# Patient Record
Sex: FEMALE | Race: WHITE
[De-identification: names, ages, dates, MRNs, and addresses within clinical notes are randomized per-mention and may not be internally consistent; named-entity substitution may affect disease eponyms.]

---

## 2018-12-26 ENCOUNTER — HOSPITAL ENCOUNTER (OUTPATIENT)
Dept: HOSPITAL 97 - ER | Age: 83
Setting detail: OBSERVATION
LOS: 1 days | Discharge: HOME | End: 2018-12-27
Attending: INTERNAL MEDICINE | Admitting: INTERNAL MEDICINE
Payer: COMMERCIAL

## 2018-12-26 VITALS — BODY MASS INDEX: 25 KG/M2

## 2018-12-26 DIAGNOSIS — I48.2: Primary | ICD-10-CM

## 2018-12-26 DIAGNOSIS — Z88.2: ICD-10-CM

## 2018-12-26 DIAGNOSIS — I10: ICD-10-CM

## 2018-12-26 DIAGNOSIS — M32.9: ICD-10-CM

## 2018-12-26 DIAGNOSIS — K21.9: ICD-10-CM

## 2018-12-26 DIAGNOSIS — E78.5: ICD-10-CM

## 2018-12-26 DIAGNOSIS — Z79.01: ICD-10-CM

## 2018-12-26 DIAGNOSIS — Z85.72: ICD-10-CM

## 2018-12-26 LAB
ALBUMIN SERPL BCP-MCNC: 3.7 G/DL (ref 3.4–5)
ALP SERPL-CCNC: 87 U/L (ref 45–117)
ALT SERPL W P-5'-P-CCNC: 43 U/L (ref 12–78)
AST SERPL W P-5'-P-CCNC: 36 U/L (ref 15–37)
BUN BLD-MCNC: 21 MG/DL (ref 7–18)
GLUCOSE SERPLBLD-MCNC: 140 MG/DL (ref 74–106)
HCT VFR BLD CALC: 35.1 % (ref 36–45)
INR BLD: 1.59
LYMPHOCYTES # SPEC AUTO: 1 K/UL (ref 0.7–4.9)
MAGNESIUM SERPL-MCNC: 2 MG/DL (ref 1.8–2.4)
PMV BLD: 8.4 FL (ref 7.6–11.3)
POTASSIUM SERPL-SCNC: 3.7 MMOL/L (ref 3.5–5.1)
RBC # BLD: 3.49 M/UL (ref 3.86–4.86)
TROPONIN (EMERG DEPT USE ONLY): 0.05 NG/ML (ref 0–0.04)
TSH SERPL DL<=0.05 MIU/L-ACNC: 6.21 UIU/ML (ref 0.36–3.74)

## 2018-12-26 PROCEDURE — 85610 PROTHROMBIN TIME: CPT

## 2018-12-26 PROCEDURE — 80048 BASIC METABOLIC PNL TOTAL CA: CPT

## 2018-12-26 PROCEDURE — 93306 TTE W/DOPPLER COMPLETE: CPT

## 2018-12-26 PROCEDURE — 84484 ASSAY OF TROPONIN QUANT: CPT

## 2018-12-26 PROCEDURE — 96375 TX/PRO/DX INJ NEW DRUG ADDON: CPT

## 2018-12-26 PROCEDURE — 71046 X-RAY EXAM CHEST 2 VIEWS: CPT

## 2018-12-26 PROCEDURE — 71045 X-RAY EXAM CHEST 1 VIEW: CPT

## 2018-12-26 PROCEDURE — 85025 COMPLETE CBC W/AUTO DIFF WBC: CPT

## 2018-12-26 PROCEDURE — 80076 HEPATIC FUNCTION PANEL: CPT

## 2018-12-26 PROCEDURE — 83735 ASSAY OF MAGNESIUM: CPT

## 2018-12-26 PROCEDURE — 36415 COLL VENOUS BLD VENIPUNCTURE: CPT

## 2018-12-26 PROCEDURE — 87088 URINE BACTERIA CULTURE: CPT

## 2018-12-26 PROCEDURE — 84443 ASSAY THYROID STIM HORMONE: CPT

## 2018-12-26 PROCEDURE — 84439 ASSAY OF FREE THYROXINE: CPT

## 2018-12-26 PROCEDURE — 99285 EMERGENCY DEPT VISIT HI MDM: CPT

## 2018-12-26 PROCEDURE — 97162 PT EVAL MOD COMPLEX 30 MIN: CPT

## 2018-12-26 PROCEDURE — 93005 ELECTROCARDIOGRAM TRACING: CPT

## 2018-12-26 PROCEDURE — 96374 THER/PROPH/DIAG INJ IV PUSH: CPT

## 2018-12-26 PROCEDURE — 83880 ASSAY OF NATRIURETIC PEPTIDE: CPT

## 2018-12-26 PROCEDURE — 87086 URINE CULTURE/COLONY COUNT: CPT

## 2018-12-26 NOTE — EDPHYS
Physician Documentation                                                                           

 [facility]                                                                

Name: Yi Stein                                                                              

Age: 83 yrs                                                                                       

Sex: Female                                                                                       

: 1935                                                                                   

MRN: [id number]                                                                                   

Arrival Date: 2018                                                                          

Time: 22:00                                                                                       

Account#: [id number]                                                                            

Bed 3                                                                                             

Private MD:                                                                                       

[facility] Physician Demetrio Peñaloza                                                                      

HPI:                                                                                              

                                                                                             

22:51 This 83 yrs old  Female presents to ER via Wheelchair with complaints of       eliza 

      Shortness Of Breath.                                                                        

22:51 The patient has shortness of breath at rest. Onset: The symptoms/episode began/occurred eliza 

      3 day(s) ago. Duration: The symptoms are continuous, and are steadily getting worse.        

      The patient's shortness of breath is aggravated by nothing, is alleviated by nothing.       

      Associated signs and symptoms: The patient has no apparent associated signs or              

      symptoms. Severity of symptoms: At their worst the symptoms were. The patient has not       

      experienced similar symptoms in the past.                                                   

                                                                                                  

Historical:                                                                                       

- Allergies:                                                                                      

22:10 Sulfa (Sulfonamide Antibiotics);                                                        ak1 

22:10 Plaquenil;                                                                              ak1 

- Home Meds:                                                                                      

22:10 metoprolol tartrate 25 mg Oral tab 1 tab 2 times per day [Active]; apixaban oral 5mg    ak1 

      oral 1 tab 2 times per day [Active]; clonazepam 1 mg Oral tab 1 tab [Active];               

      imipramine HCl 50 mg Oral tab 2 tabs 2 times per day [Active]; folic acid 1 mg Oral tab     

      1 tab once daily [Active]; pantoprazole 40 mg oral TbEC 1 tab once daily [Active];          

      potassium chloride 10 mEq Oral cpER 1 cap once daily [Active]; pravastatin 40 mg oral       

      tab 1 tab once daily [Active]; prolia [Active];                                             

- PMHx:                                                                                           

22:10 afib; Hypertension; Lupus; lymphoma;                                                    ak1 

- PSHx:                                                                                           

22:10 left knee replacement;                                                                  ak1 

                                                                                                  

- Immunization history:: Adult Immunizations unknown.                                             

- Social history:: Smoking status: Patient/guardian denies using tobacco.                         

- Ebola Screening: : No symptoms or risks identified at this time.                                

- Family history:: not pertinent.                                                                 

                                                                                                  

                                                                                                  

ROS:                                                                                              

22:51 Constitutional: Negative for fever, chills, and weight loss, Eyes: Negative for injury, eliza 

      pain, redness, and discharge, ENT: Negative for injury, pain, and discharge, Neck:          

      Negative for injury, pain, and swelling, Respiratory: Negative for shortness of breath,     

      cough, wheezing, and pleuritic chest pain, Abdomen/GI: Negative for abdominal pain,         

      nausea, vomiting, diarrhea, and constipation, Back: Negative for injury and pain, :       

      Negative for injury, bleeding, discharge, and swelling, MS/Extremity: Negative for          

      injury and deformity, Skin: Negative for injury, rash, and discoloration, Neuro:            

      Negative for headache, weakness, numbness, tingling, and seizure, Psych: Negative for       

      depression, anxiety, suicide ideation, homicidal ideation, and hallucinations,              

      Allergy/Immunology: Negative for hives, rash, and allergies, Endocrine: Negative for        

      neck swelling, polydipsia, polyuria, polyphagia, and marked weight changes,                 

      Hematologic/Lymphatic: Negative for swollen nodes, abnormal bleeding, and unusual           

      bruising.                                                                                   

22:51 Cardiovascular: Positive for chest pain, palpitations.                                      

                                                                                                  

Exam:                                                                                             

22:51 Constitutional:  This is a well developed, well nourished patient who is awake, alert,  eliza 

      and in no acute distress. Head/Face:  Normocephalic, atraumatic. Eyes:  Pupils equal        

      round and reactive to light, extra-ocular motions intact.  Lids and lashes normal.          

      Conjunctiva and sclera are non-icteric and not injected.  Cornea within normal limits.      

      Periorbital areas with no swelling, redness, or edema. ENT:  Nares patent. No nasal         

      discharge, no septal abnormalities noted.  Tympanic membranes are normal and external       

      auditory canals are clear.  Oropharynx with no redness, swelling, or masses, exudates,      

      or evidence of obstruction, uvula midline.  Mucous membranes moist. Neck:  Trachea          

      midline, no thyromegaly or masses palpated, and no cervical lymphadenopathy.  Supple,       

      full range of motion without nuchal rigidity, or vertebral point tenderness.  No            

      Meningismus. Chest/axilla:  Normal chest wall appearance and motion.  Nontender with no     

      deformity.  No lesions are appreciated. Respiratory:  Lungs have equal breath sounds        

      bilaterally, clear to auscultation and percussion.  No rales, rhonchi or wheezes noted.     

       No increased work of breathing, no retractions or nasal flaring. Abdomen/GI:  Soft,        

      non-tender, with normal bowel sounds.  No distension or tympany.  No guarding or            

      rebound.  No evidence of tenderness throughout. Back:  No spinal tenderness.  No            

      costovertebral tenderness.  Full range of motion. Female :  Normal external               

      genitalia. Skin:  Warm, dry with normal turgor.  Normal color with no rashes, no            

      lesions, and no evidence of cellulitis. MS/ Extremity:  Pulses equal, no cyanosis.          

      Neurovascular intact.  Full, normal range of motion. Neuro:  Awake and alert, GCS 15,       

      oriented to person, place, time, and situation.  Cranial nerves II-XII grossly intact.      

      Motor strength 5/5 in all extremities.  Sensory grossly intact.  Cerebellar exam            

      normal.  Normal gait. Psych:  Awake, alert, with orientation to person, place and time.     

       Behavior, mood, and affect are within normal limits.                                       

22:51 Cardiovascular: Rate: tachycardic, Rhythm: irregularly irregular, Pulses: Pulses are 4+     

      in bilateral radial, brachial, femoral, popliteal, posterior tibial and and dorsalis        

      pedis arteries.. Heart sounds: normal, normal S1and S2, no S3 or S4, no murmur, no rub,     

      no gallop, Edema: is not appreciated, JVD: is not appreciated.                              

                                                                                                  

Vital Signs:                                                                                      

22:10  / 89; Pulse 152; Resp 18; Temp 98.7(O); Pulse Ox 95% on R/A; Weight 58.06 kg     ak1 

      (R); Height 5 ft. 0 in. (152.40 cm) (R);                                                    

23:00  / 97; Pulse 149; Resp 30; Pulse Ox 96% on R/A; Pain 0/10;                        ak1 

                                                                                             

00:22  / 107; Pulse 111; Resp 25; Pulse Ox 94% on R/A;                                  ak1 

00:31  / 108; Pulse 115; Resp 27; Pulse Ox 94% on R/A;                                  ak1 

00:34  / 99; Pulse 113;                                                                 ak1 

01:43  / 108; Pulse 106; Resp 26; Pulse Ox 94% on R/A;                                  ak1 

03:04  / 100; Pulse 104; Resp 22; Temp 98.8; Pulse Ox 98% on 2 lpm NC; Pain 0/10;       ak1 

                                                                                             

22:10 Body Mass Index 25.00 (58.06 kg, 152.40 cm)                                             ak1 

                                                                                                  

MDM:                                                                                              

                                                                                             

22:12 Patient medically screened.                                                             [facility] 

22:53 Data reviewed: vital signs, nurses notes, lab test result(s), EKG, radiologic studies,  eliza 

      plain films.                                                                                

                                                                                                  

                                                                                             

22:25 Order name: Basic Metabolic Panel; Complete Time: 23:46                                   

                                                                                             

22:25 Order name: CBC with Diff; Complete Time: 22:53                                           

                                                                                             

22:25 Order name: LFT's; Complete Time: 23:46                                                   

                                                                                             

22:25 Order name: Magnesium; Complete Time: 23:46                                               

                                                                                             

22:25 Order name: NT PRO-BNP; Complete Time: 23:46                                              

                                                                                             

22:25 Order name: PT-INR; Complete Time: 22:53                                                  

                                                                                             

22:25 Order name: Troponin (emerg Dept Use Only); Complete Time: 23:46                          

                                                                                             

22:25 Order name: TSH; Complete Time: 23:46                                                     

                                                                                             

23:22 Order name: T4 Free; Complete Time: 23:46                                               [facility]

                                                                                             

00:51 Order name: Urine Culture                                                               [facility] 

                                                                                             

01:53 Order name: Basic Metabolic Panel                                                       [facility]

                                                                                             

01:53 Order name: Basic Metabolic Panel                                                       [facility]

                                                                                             

01:53 Order name: CBC with Automated Diff                                                     [facility]

                                                                                             

01:53 Order name: CBC with Automated Diff                                                     [facility]

                                                                                             

22:25 Order name: XRAY Chest (1 view)                                                           

                                                                                             

22:25 Order name: EKG; Complete Time: 22:26                                                     

                                                                                             

22:25 Order name: Cardiac monitoring; Complete Time: 22:32                                      

                                                                                             

22:25 Order name: EKG - Nurse/Tech; Complete Time: 22:32                                        

                                                                                             

22:25 Order name: IV Saline Lock; Complete Time: 22:32                                          

                                                                                             

22:25 Order name: Labs collected and sent; Complete Time: 22:32                                 

                                                                                             

22:25 Order name: O2 Per Protocol; Complete Time: 22:32                                         

                                                                                             

01:53 Order name: CONS Physician Consult                                                      [facility]

                                                                                             

01:53 Order name: Heart Healthy                                                               [facility]

                                                                                             

01:53 Order name: Echo with Doppler                                                           [facility]

                                                                                             

22:25 Order name: O2 Sat Monitoring; Complete Time: 22:32                                       

                                                                                             

22:25 Order name: Urine Dipstick-Ancillary (obtain specimen); Complete Time: 00:32            fc  

                                                                                                  

Administered Medications:                                                                         

22:50 Drug: Lopressor 5 mg {Note: 5mg given to hold the other dose per verbal by dr. enma Peñaloza.} Route: IVP; Site: right antecubital;                                             

22:50 Drug: Digoxin 0.5 mg Route: IVP; Site: right antecubital;                               ak1 

                                                                                             

00:32 Follow up: Response: No adverse reaction                                                ak1 

                                                                                             

22:50 Drug: Pepcid 20 mg Route: IVP; Site: right antecubital;                                 ak1 

                                                                                             

00:31 Follow up: Response: No adverse reaction                                                ak1 

00:32 Drug: Lopressor 5 mg Route: IVP; Site: right antecubital;                               ak1 

01:38 Drug: Rocephin - (cefTRIAXone) 1 grams Route: IVPB; Infused Over: 30 mins; Site: right  ak1 

      antecubital;                                                                                

01:40 Follow up: IV Status: Completed infusion                                                ak1 

01:39 Drug: Lopressor 5 mg Route: IVP; Site: right antecubital;                               ak1 

01:40 Follow up: Response: No adverse reaction                                                ak1 

01:39 Drug: Lasix 40 mg Route: IVP; Site: right antecubital;                                  ak1 

01:40 Follow up: Response: No adverse reaction                                                ak1 

01:39 Drug: Potassium Chloride 20 mEq Route: PO;                                              ak1 

01:40 Follow up: Response: No adverse reaction                                                ak1 

                                                                                                  

                                                                                                  

Disposition:                                                                                      

18 22:56 Hospitalization ordered by Cheryl Prescott for Inpatient Admission. Preliminary    

  diagnosis are Atrial fibrillation and flutter - with rvr, Dyspnea,                              

  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                     

  failure, Pleural effusion in other conditions classified elsewhere, Urinary                     

  tract infection, site not specified.                                                            

- Bed requested for Telemetry/MedSurg (Inpatient).                                                

- Status is Inpatient Admission.                                                              ak1 

- Condition is Fair.                                                                              

- Problem is new.                                                                                 

- Symptoms have improved.                                                                         

UTI on Admission? Yes                                                                             

                                                                                                  

                                                                                                  

                                                                                                  

Signatures:                                                                                       

Dispatcher [employer]                           Demetrio Mora MD MD cha Chretien, Felicia, RN RN fc Krenek, Amber, RN RN ak1 Garcia, Cindy, RN RN cg                                                   

                                                                                                  

Corrections: (The following items were deleted from the chart)                                    

00:46  22:56 Hospitalization Ordered by Cheryl Prescott MD for Inpatient Admission.       eliza 

      Preliminary diagnosis is Atrial fibrillation and flutter - with rvr; Dyspnea. Bed           

      requested for Telemetry/MedSurg (Inpatient). Status is Inpatient Admission. Condition       

      is Fair. Problem is new. Symptoms have improved. UTI on Admission? No. eliza                  

                                                                                             

00:51 00:46 2018 22:56 Hospitalization Ordered by Cheryl Prescott MD for Inpatient       eliza 

      Admission. Preliminary diagnosis is Atrial fibrillation and flutter - with rvr;             

      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        

      failure; Pleural effusion in other conditions classified elsewhere. Bed requested for       

      Telemetry/MedSurg (Inpatient). Status is Inpatient Admission. Condition is Fair.            

      Problem is new. Symptoms have improved. UTI on Admission? No. eliza                           

01:39 00:51 2018 22:56 Hospitalization Ordered by Cheryl Prescott MD for Inpatient       cg  

      Admission. Preliminary diagnosis is Atrial fibrillation and flutter - with rvr;             

      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        

      failure; Pleural effusion in other conditions classified elsewhere; Urinary tract           

      infection, site not specified. Bed requested for Telemetry/MedSurg (Inpatient). Status      

      is Inpatient Admission. Condition is Fair. Problem is new. Symptoms have improved. UTI      

      on Admission? Yes. eliza                                                                      

03:59 01:39 2018 22:56 Hospitalization Ordered by Cheryl Prescott MD for Inpatient       ak1 

      Admission. Preliminary diagnosis is Atrial fibrillation and flutter - with rvr;             

      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        

      failure; Pleural effusion in other conditions classified elsewhere; Urinary tract           

      infection, site not specified. Bed requested for Telemetry/MedSurg (Inpatient). Status      

      is Inpatient Admission. Condition is Fair. Problem is new. Symptoms have improved. UTI      

      on Admission? Yes. cg                                                                       

                                                                                                  

**************************************************************************************************

## 2018-12-26 NOTE — ER
Nurse's Notes                                                                                     

 [facility]                                                                

Name: Yi Stein                                                                              

Age: 83 yrs                                                                                       

Sex: Female                                                                                       

: 1935                                                                                   

MRN: [id number]                                                                                   

Arrival Date: 2018                                                                          

Time: 22:00                                                                                       

Account#: [id number]                                                                            

Bed 3                                                                                             

Private MD:                                                                                       

Diagnosis: Atrial fibrillation and flutter-with rvr;Dyspnea;Unspecified combined systolic         

  (congestive) and diastolic (congestive) heart failure;Pleural effusion in other                 

  conditions classified elsewhere;Urinary tract infection, site not specified                     

                                                                                                  

Presentation:                                                                                     

                                                                                             

22:01 Presenting complaint: Patient states: SOB with lying down X3 nights PTA. no resp        ak1 

      distress noted in triage. Transition of care: patient was not received from another         

      setting of care. Onset of symptoms is unknown. Risk Assessment: Do you want to hurt         

      yourself or someone else? Patient reports no desire to harm self or others. Initial         

      Sepsis Screen: Does the patient meet any 2 criteria? No. Patient's initial sepsis           

      screen is negative. Does the patient have a suspected source of infection? No.              

      Patient's initial sepsis screen is negative. Care prior to arrival: None.                   

22:01 Method Of Arrival: Wheelchair                                                           ak1 

22:01 Acuity: MARIE 3                                                                           ak1 

                                                                                                  

Triage Assessment:                                                                                

23:01 General: Appears in no apparent distress. Behavior is calm, cooperative. Respiratory:   ak1 

      Reports shortness of breath at rest while lying down air hunger Onset: The                  

      symptoms/episode began/occurred 3 days PTA, the patient has mild shortness of breath.       

                                                                                                  

Historical:                                                                                       

- Allergies:                                                                                      

22:10 Sulfa (Sulfonamide Antibiotics);                                                        ak1 

22:10 Plaquenil;                                                                              ak1 

- Home Meds:                                                                                      

22:10 metoprolol tartrate 25 mg Oral tab 1 tab 2 times per day [Active]; apixaban oral 5mg    ak1 

      oral 1 tab 2 times per day [Active]; clonazepam 1 mg Oral tab 1 tab [Active];               

      imipramine HCl 50 mg Oral tab 2 tabs 2 times per day [Active]; folic acid 1 mg Oral tab     

      1 tab once daily [Active]; pantoprazole 40 mg oral TbEC 1 tab once daily [Active];          

      potassium chloride 10 mEq Oral cpER 1 cap once daily [Active]; pravastatin 40 mg oral       

      tab 1 tab once daily [Active]; prolia [Active];                                             

- PMHx:                                                                                           

22:10 afib; Hypertension; Lupus; lymphoma;                                                    ak1 

- PSHx:                                                                                           

22:10 left knee replacement;                                                                  ak1 

                                                                                                  

- Immunization history:: Adult Immunizations unknown.                                             

- Social history:: Smoking status: Patient/guardian denies using tobacco.                         

- Ebola Screening: : No symptoms or risks identified at this time.                                

- Family history:: not pertinent.                                                                 

                                                                                                  

                                                                                                  

Screenin:58 Abuse screen: Denies threats or abuse. Denies injuries from another. Nutritional        ak1 

      screening: No deficits noted. Tuberculosis screening: No symptoms or risk factors           

      identified. Fall Risk None identified.                                                      

                                                                                                  

Assessment:                                                                                       

23:00 General: Appears in no apparent distress. Pain: Denies pain. Neuro: No deficits noted.  ak1 

      Cardiovascular: Rhythm is sinus tachycardia. Respiratory: Airway is patent Respiratory      

      effort is even, unlabored, Breath sounds are clear. GI: No signs and/or symptoms were       

      reported involving the gastrointestinal system. : No signs and/or symptoms were           

      reported regarding the genitourinary system. EENT: No signs and/or symptoms were            

      reported regarding the EENT system. Derm: No signs and/or symptoms reported regarding       

      the dermatologic system. Musculoskeletal: No deficits noted.                                

                                                                                             

00:23 Reassessment: 5mg Lopressor given IV (2nd dose).                                        ak1 

00:56 Reassessment: Patient appears in no apparent distress at this time. Patient and/or      ak1 

      family updated on plan of care and expected duration. Pain level reassessed. pt             

      ambulated unassisted to bedside commode. Patient states symptoms have improved.             

01:42 Reassessment: Patient appears in no apparent distress at this time. No changes from     ak1 

      previously documented assessment. Patient and/or family updated on plan of care and         

      expected duration. Pain level reassessed. Patient is alert, oriented x 3, equal             

      unlabored respirations, skin warm/dry/pink.                                                 

02:15 Reassessment: pt up to bedside commode, pt increased SOB with ambulation. pt placed on  ak1 

      oxygen 2L NC.                                                                               

02:41 Reassessment: resting with eyes closed, resp even an unlabored.                         ak1 

                                                                                                  

Vital Signs:                                                                                      

                                                                                             

22:10  / 89; Pulse 152; Resp 18; Temp 98.7(O); Pulse Ox 95% on R/A; Weight 58.06 kg     ak1 

      (R); Height 5 ft. 0 in. (152.40 cm) (R);                                                    

23:00  / 97; Pulse 149; Resp 30; Pulse Ox 96% on R/A; Pain 0/10;                        ak1 

                                                                                             

00:22  / 107; Pulse 111; Resp 25; Pulse Ox 94% on R/A;                                  ak1 

00:31  / 108; Pulse 115; Resp 27; Pulse Ox 94% on R/A;                                  ak1 

00:34  / 99; Pulse 113;                                                                 ak1 

01:43  / 108; Pulse 106; Resp 26; Pulse Ox 94% on R/A;                                  ak1 

03:04  / 100; Pulse 104; Resp 22; Temp 98.8; Pulse Ox 98% on 2 lpm NC; Pain 0/10;       ak1 

                                                                                             

22:10 Body Mass Index 25.00 (58.06 kg, 152.40 cm)                                             [facility] 

                                                                                                  

ED Course:                                                                                        

                                                                                             

22:00 Patient arrived in ED.                                                                  ag3 

22:05 Triage completed.                                                                       ak1 

22:10 Arm band placed on Patient placed in an exam room, on a stretcher.                      [facility] 

22:12 Demetrio Peñaloza MD is Attending Physician.                                             [facility] 

22:54 XRAY Chest (1 view) In Process Unspecified.                                             [facility]

22:54 Cheryl Prescott MD is Hospitalizing Provider.                                          [facility] 

22:58 Carolina Joseph, RN is Primary Nurse.                                                     ak1 

22:58 Patient has correct armband on for positive identification. Bed in low position. Call   ak1 

      light in reach. Side rails up X2. Adult w/ patient. Cardiac monitor on. Pulse ox on.        

      NIBP on.                                                                                    

                                                                                             

00:57 No provider procedures requiring assistance completed.                                  ak1 

01:42 Patient admitted, IV remains in place.                                                  ak 

                                                                                                  

Administered Medications:                                                                         

                                                                                             

22:50 Drug: Lopressor 5 mg {Note: 5mg given to hold the other dose per verbal by dr. enma Peñaloza.} Route: IVP; Site: right antecubital;                                             

22:50 Drug: Digoxin 0.5 mg Route: IVP; Site: right antecubital;                               ak1 

                                                                                             

00:32 Follow up: Response: No adverse reaction                                                [facility] 

                                                                                             

22:50 Drug: Pepcid 20 mg Route: IVP; Site: right antecubital;                                 ak1 

                                                                                             

00:31 Follow up: Response: No adverse reaction                                                ak1 

00:32 Drug: Lopressor 5 mg Route: IVP; Site: right antecubital;                               ak1 

01:38 Drug: Rocephin - (cefTRIAXone) 1 grams Route: IVPB; Infused Over: 30 mins; Site: right  ak1 

      antecubital;                                                                                

01:40 Follow up: IV Status: Completed infusion                                                ak1 

01:39 Drug: Lopressor 5 mg Route: IVP; Site: right antecubital;                               ak1 

01:40 Follow up: Response: No adverse reaction                                                ak1 

01:39 Drug: Lasix 40 mg Route: IVP; Site: right antecubital;                                  ak1 

01:40 Follow up: Response: No adverse reaction                                                ak1 

01:39 Drug: Potassium Chloride 20 mEq Route: PO;                                              ak1 

01:40 Follow up: Response: No adverse reaction                                                ak1 

                                                                                                  

                                                                                                  

Outcome:                                                                                          

                                                                                             

22:56 Decision to Hospitalize by Provider.                                                    [facility] 

                                                                                             

01:42 Condition: stable                                                                       ak1 

      Instructed on the need for admit.                                                           

03:46 Admitted to Tele accompanied by tech, via wheelchair, room 211, with oxygen, with       ak1 

      chart, Report called to  Alice BARROSO RN                                                         

03:59 Patient left the ED.                                                                    ak1 

                                                                                                  

Signatures:                                                                                       

Dispatcher [employer]                           Demetrio Mora MD MD cha Krenek, Amber RN                       RN   ak1                                                  

Tamie Smith3                                                  

                                                                                                  

**************************************************************************************************

## 2018-12-27 VITALS — DIASTOLIC BLOOD PRESSURE: 75 MMHG | TEMPERATURE: 97.5 F | SYSTOLIC BLOOD PRESSURE: 163 MMHG

## 2018-12-27 VITALS — OXYGEN SATURATION: 98 %

## 2018-12-27 RX ADMIN — LOSARTAN POTASSIUM SCH: 50 TABLET, FILM COATED ORAL at 15:00

## 2018-12-27 RX ADMIN — LOSARTAN POTASSIUM SCH MG: 50 TABLET, FILM COATED ORAL at 16:07

## 2018-12-27 NOTE — RAD REPORT
EXAM DESCRIPTION:  [facility] Single View12/26/2018 10:55 pm

 

CLINICAL HISTORY:  Shortness of breath

 

COMPARISON:  none

 

FINDINGS:  Small to moderate left and small right pleural effusions are present.

 

 Mild bilateral pulmonary opacities.

 

Heart is mildly enlarged. Central venous catheter is in place

 

IMPRESSION:  CHF

## 2018-12-27 NOTE — P.PN
Subjective


Date of Service: 12/27/18


Primary Care Provider: Dr. Julio Peraza([place], [place]); Card-Dr. Pavel Ponce


Chief Complaint: A.Fib with RVR


Subjective: Improving





Physical Examination





- Vital Signs


Temperature: 97.1 F


Blood Pressure: 181/80


Pulse: 80


Respirations: 16


Pulse Ox (%): 95





- Physical Exam


General: Alert, In no apparent distress, Oriented x3, Cooperative


HEENT: Atraumatic


Neck: Supple


Respiratory: Clear to auscultation bilaterally, Normal air movement


Cardiovascular: Normal pulses, Regular rate/rhythm


Gastrointestinal: Normal bowel sounds, Soft and benign, Non-distended, No 

tenderness, No masses, No rebound, No guarding


Musculoskeletal: No erythema, No tenderness, No warmth


Integumentary: No tenderness/swelling, No erythema, No warmth, No cyanosis


Neurological: Normal speech, Normal strength at 5/5 x4 extr, Normal tone, 

Normal affect





- Studies


Laboratory Data (last 24 hrs)





12/26/18 22:25: PT 18.8 H, INR 1.59


12/26/18 22:25: WBC 9.9, Hgb 11.9 L, Hct 35.1 L, Plt Count 268


12/26/18 22:25: Sodium 141, Potassium 3.7, BUN 21 H, Creatinine 1.00, Glucose 

140 H, Magnesium 2.0, Total Bilirubin 0.3, AST 36, ALT 43, Alkaline Phosphatase 

87





Medications List Reviewed: Yes





Assessment & Plan


Discharge Plan: Home


Plan to discharge in: 24 Hours


Physician Review Additional Text: 





Impression:


Chronic atrial fibrillation with RVR now on normal sinus rhythm


Elevated troponin and positive BNP likely underlying chronic diastolic CHF


Hypertension


Hyperlipidemia


GERD


History of lupus


History of lymphoma in remission





Plan:  


Chronic atrial fibrillation on chronic anti coagulation with RVR now on normal 

sinus rhythm:  Patient now in normal sinus rhythm. Metoprolol has been 

increased.  Patient will need continue with metoprolol and chronic anti 

coagulation therapy.  Will reassess patient this afternoon.  If much improved.  

Patient can be discharged home.  Cardiology recommends echocardiogram to 

evaluate for underlying diastolic CHF.  Patient will need to continue with a 

1500 cc per day fluid restriction and low-salt diet.  Cardiology also 

recommends the patient follow up with her PCP and cardiologist in [facility] for stress test.  Patient will likely be discharged with Lasix daily.


Elevated troponin and positive BNP likely underlying chronic diastolic CHF:  

Obtain echocardiogram to further evaluate.  Patient will likely require fluid 

restriction, ACE or ARB-inhibitor and Lasix at discharge.


Hypertension:  Continue with current medication.  Will add lisinopril for 

better blood pressure control.  Will monitor and adjust appropriately.  

Metoprolol has been adjusted.


Hyperlipidemia:  Continue with statin medication.  Patient takes Pravachol.


GERD:  Continue with Protonix.


History of lupus:  Continue follow up with her PCP and Rheumatology as an 

outpatient.


History of lymphoma in remission:  Continue follow up with her PCP and oncology 

as an outpatient. 


Time Spent Managing Pts Care (In Minutes): 55

## 2018-12-27 NOTE — P.DS
Admission Date: 12/27/18


Discharge Date: 12/27/18


Primary Care Provider: Dr. Julio Peraza([place], [place]); Card-Dr. Pavel Ponce


Disposition: ROUTINE DISCHARGE


Discharge Condition: GOOD


Reason for Admission: A.Fib with RVR


Consultations: 





Cardiology-Dr. Jones





Procedures: 





CXR:


FINDINGS:   Small to moderate left and small right pleural effusions are 

present. 


Mild bilateral interstitial lung opacities have partially resolved. 


The heart is mildly enlarged. Central venous catheter is in place 


IMPRESSION:  Improvement in CHF





ECHO: Final results pending at discharge





Medical Problem list: 


Chronic atrial fibrillation with RVR on chronic anti coagulation therapy now 

normal sinus rhythm


Elevated troponin and positive BNP likely secondary to acute on chronic 

diastolic CHF


Hypertension, on controlled


Hyperlipidemia


GERD


History of lupus


History of lymphoma in remission





Brief History of Present Illness: 





83-year-old  female presented emergency room with increasing shortness 

of breath.  Patient with history of chronic atrial fibrillation, hypertension, 

lupus and lymphoma.  Patient found to be in atrial fibrillation with RVR.  X-

ray shows CHF pattern.  Patient admitted for further treatment and evaluation.


Hospital Course: 





Patient presented with shortness of breath.  Patient found with chronic atrial 

fibrillation with RVR on chronic anti coagulation therapy.  Patient also had 

slightly elevation in her troponin and BNP likely related to acute on chronic 

diastolic CHF.  Patient reports recent diagnosis of atrial fibrillation about 2 

weeks ago by her cardiologist.  Patient seen and evaluated by Cardiology in the 

hospital.  Echocardiogram obtained.  Cardiology suspects diastolic dysfunction.

  Patient improved with increased doses of metoprolol.  Patient also received 

IV Lasix with improvement on x-ray.  At discharge she is without any 

significant shortness of breath.  She does not require oxygen at discharge. 

Patient now in normal sinus rhythm rate controlled.  At discharge patient will 

continue with metoprolol 50 mg 1 pill twice daily and Lasix 20 mg daily.  

Patient will also continue with Eliquis 5 mg 1 pill twice daily.  Patient will 

continue with a 1500 cc per day fluid restriction and low-salt diet.  She is to 

monitor her weight daily.  If her weight increases by more than 5 lb she is to 

contact her cardiologist for further recommendation.  Adjustment in her 

medication may be required in the near future.  Recommendation is for the 

patient follow up with her cardiologist within 1 week in [place], [place].  ECHO 

results will need to be followed up by her cardiologist.  Cardiology also 

recommends that the patient had a cardiac stress test to be done by her 

cardiologist as an outpatient to further evaluate.





Patient has hypertension.  Medication adjusted during her stay.  Metoprolol was 

increased.  Losartan was added.  Patient will continue with metoprolol 50 mg 1 

pill twice daily and losartan 50 mg 1 pill daily for better blood pressure 

control.  Recommendation is to maintain blood pressures less 150/80.  Further 

adjustment can be done by cardiology.





Patient has hyperlipidemia.  Patient will continue with Pravachol 40 mg daily.





Patient has GERD.  She will continue with Protonix 40 mg 1 pill once daily.





Patient with history of lupus and lymphoma currently in remission.  Patient to 

follow up with her specialists and her PCP.


Vital Signs/Physical Exam: 














Temp Pulse Resp BP Pulse Ox


 


 97.1 F   80   16   181/80 H  95 


 


 12/27/18 14:20  12/27/18 14:20  12/27/18 14:20  12/27/18 14:20  12/27/18 14:20








General: Alert, In no apparent distress, Oriented x3, Cooperative


HEENT: Atraumatic


Neck: Supple


Respiratory: Clear to auscultation bilaterally, Normal air movement


Cardiovascular: Normal pulses, Regular rate/rhythm


Gastrointestinal: Normal bowel sounds, Soft and benign, Non-distended, No 

tenderness, No masses, No rebound, No guarding


Musculoskeletal: No erythema, No tenderness, No warmth


Integumentary: No tenderness/swelling, No erythema, No warmth, No cyanosis


Neurological: Normal speech, Normal strength at 5/5 x4 extr, Normal tone, 

Normal affect


Laboratory Data at Discharge: 














WBC  9.9 K/uL (4.3-10.9)   12/26/18  22:25    


 


Hgb  11.9 g/dL (12.0-15.0)  L  12/26/18  22:25    


 


Hct  35.1 % (36.0-45.0)  L  12/26/18  22:25    


 


Plt Count  268 K/uL (152-406)   12/26/18  22:25    


 


PT  18.8 SECONDS (9.5-12.5)  H  12/26/18  22:25    


 


INR  1.59   12/26/18  22:25    


 


Sodium  141 mmol/L (136-145)   12/26/18  22:25    


 


Potassium  3.7 mmol/L (3.5-5.1)   12/26/18  22:25    


 


BUN  21 mg/dL (7-18)  H  12/26/18  22:25    


 


Creatinine  1.00 mg/dL (0.55-1.3)   12/26/18  22:25    


 


Glucose  140 mg/dL ()  H  12/26/18  22:25    


 


Magnesium  2.0 mg/dL (1.8-2.4)   12/26/18  22:25    


 


Total Bilirubin  0.3 mg/dL (0.2-1.0)   12/26/18  22:25    


 


AST  36 U/L (15-37)   12/26/18  22:25    


 


ALT  43 U/L (12-78)   12/26/18  22:25    


 


Alkaline Phosphatase  87 U/L ()   12/26/18  22:25    


 


Troponin I  0.04 ng/mL (0.0-0.045)   12/27/18  12:40    








Home Medications: 








Apixaban [Eliquis] 5 mg PO BID 12/27/18 


Folic Acid 1 mg PO DAILY 12/27/18 


Furosemide [Lasix] 20 mg PO DAILY #30 tab 12/27/18 


Imipramine HCl 2 tab PO BID 12/27/18 


Losartan Potassium [Cozaar*] 50 mg PO DAILY #30 tablet 12/27/18 


Metoprolol Tartrate [Lopressor*] 50 mg PO BID #60 tab 12/27/18 


Pantoprazole Sodium 40 mg PO DAILY 12/27/18 


Potassium Chloride 10 meq PO DAILY 12/27/18 


Pravastatin Sodium 40 mg PO BEDTIME 12/27/18 


clonazePAM [Clonazepam] 1 mg PO BEDTIME 12/27/18 





New Medications: 


Furosemide [Lasix] 20 mg PO DAILY #30 tab


Losartan Potassium [Cozaar*] 50 mg PO DAILY #30 tablet


Metoprolol Tartrate [Lopressor*] 50 mg PO BID #60 tab


Patient Discharge Instructions: 1.  Patient will need a follow up with her PCP 

and cardiologist in 1 week to follow up this hospitalization.  2.  Patient 

presented with shortness of breath.  Patient found with chronic atrial 

fibrillation with RVR on chronic anti coagulation therapy.  Patient also had 

slightly elevation in her troponin and BNP likely related to acute on chronic 

diastolic CHF.  Patient seen and evaluated by Cardiology.  Echocardiogram 

obtained.  Cardiology suspects diastolic dysfunction.  Patient improved with 

increased doses of metoprolol.  Patient also received IV Lasix with improvement 

on x-ray.  At discharge she is without any significant shortness of breath.  

She does not require oxygen at discharge. Patient now in normal sinus rhythm 

rate controlled.  At discharge patient will continue with metoprolol 50 mg 1 

pill twice daily and Lasix 20 mg daily.  Patient will also continue with 

Eliquis 5 mg 1 pill twice daily.  Patient will continue with a 1500 cc per day 

fluid restriction and low-salt diet.  She is to monitor her weight daily.  If 

her weight increases by more than 5 lb she is to contact her cardiologist for 

further recommendation.  Adjustment in her medication may be required in the 

near future.  Recommendation is for the patient follow up with her cardiologist 

within 1 week in [place], [place].  ECHO results will need to be followed up by 

her cardiologist.  Cardiology also recommends that the patient had a cardiac 

stress test to be done by her cardiologist as an outpatient to further 

evaluate.  3.  Patient has hypertension.  Medication adjusted during her stay.  

Metoprolol was increased.  Losartan was added.  Patient will continue with 

metoprolol 50 mg 1 pill twice daily and losartan 50 mg 1 pill daily for better 

blood pressure control.  Recommendation is to maintain blood pressures less 150/

80.  Further adjustment can be done by cardiology.  4.  Patient has 

hyperlipidemia.  Patient will continue with Pravachol 40 mg daily.  5.  Patient 

has GERD.  She will continue with Protonix 40 mg 1 pill once daily.  6.  

Patient with history of lupus and lymphoma currently in remission.  Patient to 

follow up with her specialists and her PCP.


Diet: AHA


Activity: Fall precautions


Time spent managing pt's care (in minutes): 55

## 2018-12-27 NOTE — ECHO
HEIGHT: 5 ft 0 in   WEIGHT: 128 lb 0 oz   DATE OF STUDY: 12/27/18   REFER DR: Cheryl Tapia MD

2-DIMENSIONAL: YES

     M.MODE: YES

 DOPPLER: YES

COLOR FLOW: YES



                    TDS:  NO

PORTABLE: NO

 DEFINITY:  NO

BUBBLE STUDY: NO





DIAGNOSIS:  ATRIAL FIBRILLATION WITH RAPID VENTRICULAR RESPONSE



CARDIAC HISTORY:  

CATHERIZATION: NO

SURGERY: NO

PROSTHETIC VALVE: NO

PACEMAKER: NO





MEASUREMENTS (cm)

    DIASTOLIC (NORMALS)                 SYSTOLIC (NORMALS)

IVSd                 1.2 (0.6-1.2)                    LA Diam 4.2 (1.9-4.0)     LVEF       
  40-45%  

LVIDd               4.4 (3.5-5.7)                        LVIDs      3.2 (2.0-3.5)     %FS  
        28%

LVPWd             1.0 (0.6-1.2)

Ao Diam           2.9 (2.0-3.7)



2 DIMENSIONAL ASSESSMENT:

RIGHT ATRIUM:                   NORMAL

LEFT ATRIUM:       DILATED



RIGHT VENTRICLE:            NORMAL

LEFT VENTRICLE: NORMAL



TRICUSPID VALVE:             NORMAL

MITRAL VALVE:     MITRAL ANNULAR CALCIFICATION



PULMONIC VALVE:             NORMAL

AORTIC VALVE:     MITRAL ANNULAR CALCIFICATION



PERICARDIAL EFFUSION: NONE

AORTIC ROOT:      NORMAL





LEFT VENTRICULAR WALL MOTION:     PARADOXICAL SEPTUM.



DOPPLER/COLOR FLOW:     MILD TRICUSPID REGURGITATION, MITRAL REGURGITATION  MILD 
PULMONARY HYPERTENSION.



COMMENTS:      EJECTION FRACTION 40-45%- MILD GLOBAL HYPOKINESIS. PARADOXICAL SEPTUM. 
MITRAL ANNULAR CALCIFICATION. AORTIC SCLEROSIS. LEFT ATRIAL ENLARGEMENT.



TECHNOLOGIST:   IAN SCHULTZ

## 2018-12-27 NOTE — EKG
Test Date:    2018-12-26               Test Time:    22:15:30

Technician:   MARY                                     

                                                     

MEASUREMENT RESULTS:                                       

Intervals:                                           

Rate:         153                                    

LA:                                                  

QRSD:         92                                     

QT:           316                                    

QTc:          504                                    

Axis:                                                

P:                                                   

LA:                                                  

QRS:          105                                    

T:            -41                                    

                                                     

INTERPRETIVE STATEMENTS:                                       

                                                     

Atrial fibrillation with rapid ventricular response

Rightward axis

Anterior infarct, age undetermined

T wave abnormality, consider inferior ischemia

Abnormal ECG

No previous ECG available for comparison



Electronically Signed On 12-27-18 06:24:55 CST by Kris Negrete

## 2018-12-27 NOTE — RAD REPORT
EXAM DESCRIPTION:  Bakari Pa And Lat (2 Views)12/27/2018 8:08 am

 

CLINICAL HISTORY:  Shortness of breath

 

COMPARISON:  December 22, 2018

 

FINDINGS:   Small to moderate left and small right pleural effusions are present.

 

Mild bilateral interstitial lung opacities have partially resolved.

 

The heart is mildly enlarged. Central venous catheter is in place

 

IMPRESSION:  Improvement in CHF

## 2018-12-27 NOTE — P.HP
Certification for Inpatient


Patient admitted to: Inpatient


With expected LOS: >2 Midnights


Practitioner: I am a practitioner with admitting privileges, knowledge of 

patient current condition, hospital course, and medical plan of care.


Services: Services provided to patient in accordance with Admission 

requirements found in Title 42 Section 412.3 of the Code of Federal Regulations





Patient History


Date of Service: 12/27/18


Reason for admission: A.Fib with RVR


History of Present Illness: 





Ms Stein is an 83 years old woman with history of HTN, Lupus, Lymphoma, A.fib, 

recently diagnosed, who start 3 days ago with progressive SOB. She noticed 

specially at night when lay in bed. She has been using 3 pillows at night to 

sleep due to her SOB. She denied chest pain, no palpitations. She denied fever, 

chill but has had sweating episodes at night. at arrival EKG showed A.Ariela with 

RVR, at 152 bpm. CXR remarkable for venous fluid overload. Lab work shows 

normal WBC count, elevated proBNP, TSH. She was treated with IV Lasix and Beta 

blocker, improving significantly her symptosm. HR is currently at 105 bpm.


Allergies





azathioprine Adverse Reaction (Verified 12/27/18 00:30)


 Weakness


hydroxychloroquine [From Plaquenil] Adverse Reaction (Verified 12/27/18 00:30)


 Itching


Sulfa (Sulfonamide Antibiotics) Adverse Reaction (Verified 12/27/18 00:30)


 Itching/Hives/Rash





Home medications list reviewed: Yes


Home Medications: 








Apixaban [Eliquis] 5 mg PO BID 12/27/18 


Folic Acid 1 mg PO DAILY 12/27/18 


Imipramine HCl 2 tab PO BID 12/27/18 


Metoprolol Tartrate 25 mg PO BID 12/27/18 


Pantoprazole Sodium 40 mg PO DAILY 12/27/18 


Potassium Chloride 10 meq PO DAILY 12/27/18 


Pravastatin Sodium 40 mg PO BEDTIME 12/27/18 


clonazePAM [Clonazepam] 1 mg PO BEDTIME 12/27/18 








- Past Medical/Surgical History


-: A.Fib


-: HTN


-: lymphoma


-: Lupus


-: knee replacement





- Family History


Family History: Reviewed- Non-Contributory





- Social History


Smoking Status: Never smoker


Alcohol use: No


CD- Drugs: No


Place of Residence: Home





Review of Systems


10-point ROS is otherwise unremarkable





Physical Examination





- Physical Exam


General: Alert, In no apparent distress


HEENT: Atraumatic, PERRLA, Mucous membr. moist/pink, EOMI, Sclerae nonicteric


Neck: Supple, 2+ carotid pulse no bruit, No LAD, Without JVD or thyroid 

abnormality


Respiratory: Normal air movement, Crackles/rales (bibasilar rales)


Cardiovascular: Normal S1 S2, Irregular heart rate/rhythm


Gastrointestinal: Normal bowel sounds, No tenderness


Musculoskeletal: No tenderness


Integumentary: No rashes


Neurological: Normal speech, Normal strength at 5/5 x4 extr, Normal tone, 

Normal affect


Lymphatics: No axilla or inguinal lymphadenopathy





- Studies


Laboratory Data (last 24 hrs)





12/26/18 22:25: PT 18.8 H, INR 1.59


12/26/18 22:25: WBC 9.9, Hgb 11.9 L, Hct 35.1 L, Plt Count 268


12/26/18 22:25: Sodium 141, Potassium 3.7, BUN 21 H, Creatinine 1.00, Glucose 

140 H, Magnesium 2.0, Total Bilirubin 0.3, AST 36, ALT 43, Alkaline Phosphatase 

87








Assessment and Plan





- Problems (Diagnosis)


(1) Atrial fibrillation with RVR


Current Visit: Yes   Status: Acute   





(2) HTN (hypertension)


Current Visit: Yes   Status: Acute   


Qualifiers: 


   Hypertension type: essential hypertension   Qualified Code(s): I10 - 

Essential (primary) hypertension   





(3) Lymphoma


Current Visit: Yes   Status: Acute   


Qualifiers: 


   Lymphoma type: unspecified type   Lymphoma site: unspecified region   

Qualified Code(s): C85.90 - Non-Hodgkin lymphoma, unspecified, unspecified site

   





(4) CHF (congestive heart failure)


Current Visit: Yes   Status: Acute   


Qualifiers: 


   Heart failure type: unspecified   Heart failure chronicity: acute on chronic

   Qualified Code(s): I50.9 - Heart failure, unspecified   





- Plan





The patient will be admitted to the hospital due to a.fib with RVR in context 

of CHF. She has improved with IV lasix and beta blockers. Her current HR is 

105. Will order ECHO, continue metoprolol PO, IV lasix. Consult Cardiology team.





- Advance Directives


Does patient have a Living Will: No


Does patient have a Durable POA for Healthcare: No





- Code Status/Comfort Care


Code Status Assessed: Yes


Code Status: Full Code

## 2018-12-27 NOTE — CON
Date of Consultation:  12/27/2018



Reason For Consultation:  Atrial fibrillation.



History Of Present Illness:  Ms. Stein is an 83-year-old woman.  She has a history of atrial fibrilla
tion for which she takes Eliquis and metoprolol.  She has a history of gastroesophageal reflux diseas
e, dyslipidemia, hypertension, lupus, and lymphoma that is in remission.  She came in with shortness 
of breath, was found to have atrial fibrillation at a rate of 109.  Troponin was 0.05.  Her BNP was 1
2,980.  Her TSH was 6.210.  Denied any chest pain or syncope.



Past Medical History:  As stated above.



Allergies:  TO SULFA, PLAQUENIL, AND AZATHIOPRINE.



Medications At Home:  Include Eliquis, metoprolol, Protonix, Pravachol.



Review of Systems:

Negative.



Social History:  Negative.  She lives in [place], [place] that is where she sees a physician for her h
eart there.



Family History:  Noncontributory.



Physical Examination:

General:  She was pleasant, in no acute distress. 

Vital Signs:  Stable.  She was in atrial fibrillation at a rate of 80 now. 

HEENT:  Negative. 

Neck:  Supple with no bruit. 

Chest:  Clear to auscultation and percussion on the right.  She has some rales of the left base. 

Cardiac:  Revealed atrial fibrillation. 

Abdomen:  Obese but benign. 

Extremities:  Revealed no clubbing or cyanosis.  She had trace edema.



Diagnostic Data:  As stated earlier.



Impression And Plan:  

1.Chronic atrial fibrillation with rapid ventricular response.  I think we need to continue her Eliq
uis and increase her metoprolol.

2.Positive troponin, positive BNP.  We will get an echocardiogram to rule out congestive heart failu
re.  She may have diastolic dysfunction based on her atrial fibrillation and hypertension.  We will s
ee what that shows.  Certainly adding an ACE inhibitor and low-dose Lasix may be reasonable.

3.Dyslipidemia, on Pravachol.

4.Gastroesophageal reflux, on Protonix.

5.Lupus.

6.Lymphoma in remission.  Following her physician in [place], [place] where she lives.  She will foll
ow up with him.  I did suggest that she should have a stress test in the near future, that can be don
e as an outpatient.





NB/ADAIR

DD:  12/27/2018 11:31:46Voice ID:  [id number]

DT:  12/27/2018 12:46:45Report ID:  [id number]